# Patient Record
(demographics unavailable — no encounter records)

---

## 2024-11-08 NOTE — DATA REVIEWED
[de-identified] : Left: Hearing -4kHz with a moderate HL 6-8kHz Right: Mild to severe SNHL Type A tymps AU

## 2024-11-08 NOTE — HISTORY OF PRESENT ILLNESS
[de-identified] : 66 yo F history of right ear surgery now with right otalgia likely ETD - audio showed asymmetric SNHL - trial of steroids and Flonase. Continues to take Flonase. No change in hearing or otalgia. Continues to have intermittent tinnitus AD. No otorrhea, ear infections or vertigo or headaches related to hearing loss. Had MRI - normal.

## 2024-11-08 NOTE — DATA REVIEWED
[de-identified] : Left: Hearing -4kHz with a moderate HL 6-8kHz Right: Mild to severe SNHL Type A tymps AU

## 2024-11-20 NOTE — REASON FOR VISIT
[Follow-Up] : a follow-up visit [TextBox_44] : acid reflux, asthma, BOOP, dysphonia, GERD, esophageal spasm, snoring, and shortness of breath, OW, abnormal CT ?early ILDz

## 2024-11-20 NOTE — ADDENDUM
[FreeTextEntry1] : Documented by Ghada Vasquez acting as a scribe for Dr. Yvon Bryant on 11/20/2024. All medical record entries made by the Scribe were at my, Dr. vYon Bryant's, direction and personally dictated by me on 11/20/2024. I have reviewed the chart and agree that the record accurately reflects my personal performance of the history, physical exam, assessment and plan. I have also personally directed, reviewed, and agree with the discharge instructions.

## 2024-11-20 NOTE — PROCEDURE
[FreeTextEntry1] : Full PFT reveals normal flows; FEV1 was 2.33L which is 114% of predicted; normal lung volumes; normal diffusion at 13.45, which is 74% of predicted; normal flow volume loop. PFTs were performed to evaluate for asthma  6 minute walk test reveals a low saturation of 91% with no evidence of dyspnea or fatigue; walked 586.8 meters   FENO was 28; a normal value being less than 25 Fractional exhaled nitric oxide (FENO) is regarded as a simple, noninvasive method for assessing eosinophilic airway inflammation. Produced by a variety of cells within the lung, nitric oxide (NO) concentrations are generally low in healthy individuals. However, high concentrations of NO appear to be involved in nonspecific host defense mechanisms and chronic inflammatory diseases such as asthma. The American Thoracic Society (ATS) therefore has recommended using FENO to aid in the diagnosis and monitoring of eosinophilic airway inflammation and asthma, and for identifying steroid responsive individuals whose chronic respiratory symptoms may be caused by airway inflammation.

## 2024-11-20 NOTE — HISTORY OF PRESENT ILLNESS
[FreeTextEntry1] : Ms. Talley is a 67 year old female with a history of acid reflux, OW asthma, BOOP, dysphonia, GERD, esophageal spasm, snoring, and shortness of breath who comes in for a follow-up pulmonary evaluation.  Her chief complaint is   -she notes energy levels are good  -she notes exercising (walking 3 miles per day) -she notes appetite is stable  -she notes bowels are regular  -she notes reflux is controlled on Rx -she notes sleeping for 6-7 hours, interrupted when her daughter comes home late -she denies muscle or joint aches and pains -she notes mild sinus congestion at this time -she notes frequent exposures at school/work -she notes she's on Mullein since 2 weeks ago to reduce her mucus production -she notes she's coughing up a lot of clear, milky mucus throughout the day -she notes hearing loss. She's getting fitted for a hearing aid early 12/2024 -she notes drinking honey, manohar seeds, lemon, and hot water every morning  -she denies any headaches, nausea, emesis, fever, chills, sweats, chest pain, chest pressure, wheezing, palpitations, diarrhea, constipation, dysphagia, vertigo, arthralgias, myalgias, leg swelling, itchy eyes, itchy ears, or sour taste in the mouth.

## 2024-11-20 NOTE — ASSESSMENT
[FreeTextEntry1] : Ms. Talley is a 67 year old female who has a history of HLD, Anxiety, allergies, asthma, BOOP, OW, TMJ, reflux Rx (EgD (+) c/w Candida esophagitis and GERD. (reflux Rx (EgD (+) c/w Candida esophagitis) 2/2021; 9/2021 - Abnormal CT RUL nodule- symptomatic reflux (Still). S/p COVID-19 11/2022- active asthma, bronchitis 1/2023 - s/p Strep 11/223 - (resolved) - Thriving except asymptomatic abnormal CT "gg opacities"  Her shortness of breath is felt to be multifactorial due to: -asthma -early ILDz -poor breathing mechanics -overweight -out of shape -? Cardiac disease  Problem 1: Asthma - (quiet) -continue Bevespi 2 puffs BID - continue Alvesco 80 2 puffs BID prn -continue Ventolin 2 puffs Q6H, pre-exercise/ nebulizer Albuterol.83 q6H -continue Singulair 10 mg QD at bedtime -Asthma is believed to be caused by inherited (genetic) and environmental factor, but its exact cause is unknown. Asthma may be triggered by allergens, lung infections, or irritants in the air. Asthma triggers are different for each person -Inhaler technique reviewed as well as oral hygiene techniques reviewed with patient. Avoidance of cold air, extremes of temperature, rescue inhaler should be used before exercise. Order of medication reviewed with patient. Recommended use of a cool mist humidifier in the bedroom.  problem 1A: Eosinophilic Asthma -s/p Strongyloides (WNL) 5/2024 - Nucala/ Fasenra/Dupixent if needed  -The safety and efficacy of Nucala was established in three double-blind, randomized, placebo-controlled trials in patients with severe asthma. Compared to a placebo, patients with severe asthma receiving Nucala had fewer exacerbation requiring hospitalization and/or emergency department visits, and a longer time to first exacerbation. In addition, patients with severe asthma receiving Nucala or Fasenra experienced greater reductions in their daily maintenance oral corticosteroid dose, while maintaining asthma control compared with patients receiving placebo. Treatment with Nucala did not result in a significant improvement in lung function, as measured by the volume of air exhaled by patients in one second. The most common side effects include: headache, injection site reactions, back pain, weakness, and fatigue; hypersensitivity reactions can occur within hours or days including swelling of the face, mouth, and tongue, fainting, dizziness, hives, breathing problems, and rash; herpes zoster infections have occurred. The drug is a monoclonal antibody that inhibits interleukin-5 which helps regular eosinophils, a type of white blood cell that contributes to asthma. The over-production of eosinophils can cause inflammation in the lungs, increasing the frequency of asthma attacks. Patients must also take other medications, including high dose inhaled corticosteroids and at least one additional asthma drug.  Problem 1B: COVID-19 infection 11 /2022 (Resolved) -Paxlovid 5 day course -recommended 10 day quarantine -recommended Bruna Coleman Cold and Flu  Problem 2: BOOP -currently inactive, on no therapy  Problem 2A: Abnormal CT DDX - RUL density 1.9cm (resolved), ?early ILDz 6/2024 -rheumatology work-up negative 6/2024 -next HRCT of the chest prone and supine 5/2025- if progressive, get a biopsy -add  mg BID (11/2024) -continue Mullein -s/p blood work: Quantiferon Gold, Coccidioides Antibody, cryptococcus antibody, histoplasma antibody (WNL) -CAT scans are the only radiological modality to identify abnormalities within the lungs with regards to nodules/masses/lymph nodes. Risks, benefits were reviewed in detail. The guidelines for abnormalities include follow up CT scans at various intervals which could range from 6 weeks to 1 year intervals. If there is a change for the worse then consideration for a biopsy will be considered if the patient is a candidate. Second opinion evaluation with a thoracic surgeon or an interventional radiologist could be offered.  Problem 3: GERD / duodenal ulcer-resolved -continue Dexilant 60 mg QAM, pre-breakfast -continue Gaviscon PRN -continue Pepcid 40 mg QHS -s/p GI evaluation- EDG and colonoscopy performed (all normal) -Rule of 2s: avoid eating too much, eating too late, eating too spicy, eating two hours before bed -Things to avoid including overeating, spicy foods, tight clothing, eating within three hours of bed, this list is not all inclusive. -For treatment of reflux, possible options discussed including diet control, H2 blockers, PPIs, as well as coating motility agents discussed as treatment options. Timing of meals and proximity of last meal to sleep were discussed. If symptoms persist, a formal gastrointestinal evaluation is needed.  problem 3A: Candida Esophagitis (2021)- resolved -s/p Fluconazole x 1 month x 2nd time -recommended Immuno Force A  Problem 4: obesity (now overweight) -recommended "10-Day Detox Diet" by Dr. Makr Mayo followed by 2 weeks of probiotics -recommended to read "Bright Spots and Landmines" -recommended 150 minutes exercise a week -Weight loss, exercise, and diet control were discussed and are highly encouraged. Treatment options were given such as, aqua therapy, and contacting a nutritionist. Recommended to use the elliptical, stationary bike, less use of treadmill.  problem 5: TMJ (controlled) -recommend dental device to be used at night and was referred to Dr. Giuseppe Coleman  Problem 6: Allergy (quiet) -Continue Children's Claritin/ Zyrtec 10 qHS -continue Dymista 1 sniff BID -Recommended Xlear nasal saline spray Environmental measures for allergies were encouraged including mattress and pillow cover, air purifier, and environmental controls.  Problem 7: snoring ?ZAKIYA -complete home sleep study (NC) -Sleep apnea is associated with adverse clinical consequences which can affect most organ systems. Cardiovascular disease risk includes arrhythmias, atrial fibrillation, hypertension, coronary artery disease, and stroke. Metabolic disorders include diabetes type 2, non-alcoholic fatty liver disease. Mood disorder especially depression; and cognitive decline especially in the elderly. Associations with chronic reflux/Nelson's esophagus some but not all inclusive. -Reasons include arousal consistent with hypopnea; respiratory events most prominent in REM sleep or supine position; therefore sleep staging and body position are important for accurate diagnosis and estimation of AHI.  problem 8: health maintenance -s/p COVID 19 vaccine (Pfizer x 3) -s/p influenza vaccine -2023 -recommended strep pneumonia vaccines: Prevnar-13 vaccine, followed by Pneumo vaccine 23 one year following -recommended early intervention for URIs -recommended regular osteoporosis evaluations -recommended early dermatological evaluations -recommended after the age of 50 to the age of 70, colonoscopy every 5 years -Recommend thyroid tests for pre tibial edema and evaluation with Dr. Parish EDGE in 6 months She is encouraged to call with any changes, concerns, or questions.

## 2024-12-03 NOTE — HISTORY OF PRESENT ILLNESS
[FreeTextEntry1] : 67 year old female patient with asymmetrical hearing loss, Hearing -4000Hz sloping to moderate hearing loss 6000-8000Hz in the left ear and mild SNHL at 250 Hz to hearing -1000Hz to mild to severe SNHL 1500-8000Hz in the right ear with excellent speech discrimination AU.  Patient has history of middle ear surgery in the right ear 11 years ago. She is followed by Dr. Simpson who provided medical clearance for hearing aids. 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  She reports non-bothersome tinnitus and difficulties understanding speech, mostly in her right ear. Her biggest goal is to hear others better on a daily basis.

## 2024-12-03 NOTE — ASSESSMENT
[FreeTextEntry1] :  Reviewed patient's hearing loss and discussed implications on speech and language understanding. Discussed how hearing aids work, levels of technology, styles of hearing aids, rechargeability, and realistic expectations re: hearing aids. Demo'd Oticon Intent 1 miniRITE-R hearing aids at [facility] 3 with 8mm DV dome. Patient noted difference in sound quality and did not find sound overwhelming. Patient opted for Real 3 miniRITE-R hearing aids. Will order in color chestnut with 2-85 receivers and 8mm DV domes, bilaterally. Reviewed insurance benefit, patient paid deposit today.

## 2024-12-03 NOTE — ASSESSMENT
[FreeTextEntry1] :  Reviewed patient's hearing loss and discussed implications on speech and language understanding. Discussed how hearing aids work, levels of technology, styles of hearing aids, rechargeability, and realistic expectations re: hearing aids. Demo'd Oticon Intent 1 miniRITE-R hearing aids at [facility] 3 with 8mm DV dome. Patient noted difference in sound quality and did not find sound overwhelming. Patient opted for Real 3 miniRITE-R hearing aids. Will order in color chestnut with 2-85 receivers and 8mm DV domes, bilaterally. Reviewed insurance benefit, patient paid deposit today.

## 2025-03-04 NOTE — HISTORY OF PRESENT ILLNESS
[FreeTextEntry1] :  67 year old female patient with asymmetrical hearing loss, Hearing -4000Hz sloping to moderate hearing loss 6000-8000Hz in the left ear and mild SNHL at 250 Hz to hearing -1000Hz to mild to severe SNHL 1500-8000Hz in the right ear with excellent speech discrimination AU. Patient has history of middle ear surgery in the right ear 11 years ago. She is followed by Dr. Simpson  Hearing Aid Dispensed: Oticon Intent 3 MR-R Ears: AU Right SN: [id number] Left SN: [id number]  SN: [id number] Warranty Date: 02/05/2028 Receivers: 85-2 Domes: 8mm DV Accessory: MD-IT [id number]  [FreeTextEntry8] : Seen today for HAC following HA dispensing. Pt reports consistent use, very pleased with hearing aids- noted improvements in multiple listening environments.

## 2025-03-04 NOTE — PROCEDURE
[de-identified] : Cleaned and checked hearing aids, listening check revealed aids in good working order. Aids currently set to acclim level 2- increased to 3 in office- pt reported good fit and sound quality. Will leave aids set this way. Has follow up with Dr. Simpson in May, recommended HAC following updated audio.

## 2025-05-27 NOTE — IMAGING
[de-identified] : Right ring finger  Moderately swollen with evolving ecchymosis centered at the PIP joint, TTP  + FDS/FDP  Painful finger ROM  Able to make a loose composite fist  +AIN/ PIN/ Ulnar n SILT throughout fingers wwp  3 views right hand: Minimally displaced avulsion fracture from the base of the volar middle phalanx of the ring finger, preserved PIP and DIP joints

## 2025-05-27 NOTE — HISTORY OF PRESENT ILLNESS
[de-identified] : 05/27/2025 FREEMAN 67 year F is here for Location: Right hand  Complaint: The patient presents to the office today for evaluation of right ring finger pain and swelling.  She notes a sudden onset of pain and swelling after a mechanical fall at home, landing on her extended right ring finger.  She noted moderate pain and swelling but did not immediately seek evaluation.  Today the patient continues to note moderate pain with motion but denies numbness and tingling.  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

## 2025-05-27 NOTE — IMAGING
[de-identified] : Right ring finger  Moderately swollen with evolving ecchymosis centered at the PIP joint, TTP  + FDS/FDP  Painful finger ROM  Able to make a loose composite fist  +AIN/ PIN/ Ulnar n SILT throughout fingers wwp  3 views right hand: Minimally displaced avulsion fracture from the base of the volar middle phalanx of the ring finger, preserved PIP and DIP joints

## 2025-05-27 NOTE — ASSESSMENT
[FreeTextEntry1] : 67-year-old RHD F with right ring finger PIP sprain and avulsion fracture - reviewed the nature of this injury and prognosis with the patient in layman's terms - discussed indications for both operative and nonoperative treatment - reviewed conservative treatment options including the role for early active range of motion, anti-inflammatory medications and therapy - reviewed risks, benefits and alternatives to these - activity modifications reviewed, finger range of motion exercises demonstrated - buddy loops applied to the ring and middle fingers - NSAIDs as needed for pain, Mobic Rx provided, patient cautioned on side effect profile and risks with concomitant NSAID use - f/u in 2 weeks for motion check, will consider OT at that time   My cumulative time spent on this patient's visit included: Preparation for the visit, review of the medical records, review of pertinent diagnostic studies, examination and counseling of the patient on the above diagnosis, treatment plan and prognosis, orders of diagnostic tests, medications and/or appropriate procedures and documentation in the medical records of today's visit.  30 minutes were spent on this encounter
[FreeTextEntry1] : 67-year-old RHD F with right ring finger PIP sprain and avulsion fracture - reviewed the nature of this injury and prognosis with the patient in layman's terms - discussed indications for both operative and nonoperative treatment - reviewed conservative treatment options including the role for early active range of motion, anti-inflammatory medications and therapy - reviewed risks, benefits and alternatives to these - activity modifications reviewed, finger range of motion exercises demonstrated - buddy loops applied to the ring and middle fingers - NSAIDs as needed for pain, Mobic Rx provided, patient cautioned on side effect profile and risks with concomitant NSAID use - f/u in 2 weeks for motion check, will consider OT at that time   My cumulative time spent on this patient's visit included: Preparation for the visit, review of the medical records, review of pertinent diagnostic studies, examination and counseling of the patient on the above diagnosis, treatment plan and prognosis, orders of diagnostic tests, medications and/or appropriate procedures and documentation in the medical records of today's visit.  30 minutes were spent on this encounter
marijuana

## 2025-05-27 NOTE — HISTORY OF PRESENT ILLNESS
[de-identified] : 05/27/2025 FREEMAN 67 year F is here for Location: Right hand  Complaint: The patient presents to the office today for evaluation of right ring finger pain and swelling.  She notes a sudden onset of pain and swelling after a mechanical fall at home, landing on her extended right ring finger.  She noted moderate pain and swelling but did not immediately seek evaluation.  Today the patient continues to note moderate pain with motion but denies numbness and tingling.  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

## 2025-06-11 NOTE — IMAGING
[de-identified] : Right ring finger  Mildly swollen centered at the PIP joint, TTP  + FDS/FDP  Painful finger ROM, improved from prior Able to make a full composite fist  +AIN/ PIN/ Ulnar n SILT throughout fingers wwp  3 views right hand: Minimally displaced avulsion fracture from the base of the volar middle phalanx of the ring finger, preserved PIP and DIP joints

## 2025-06-11 NOTE — ASSESSMENT
[FreeTextEntry1] : 67-year-old RHD F with right ring finger PIP sprain and avulsion fracture - reviewed the nature of this injury and prognosis with the patient in layman's terms - discussed indications for both operative and nonoperative treatment - reviewed conservative treatment options including the role for early active range of motion, anti-inflammatory medications and therapy - reviewed risks, benefits and alternatives to these - activity modifications reviewed, continue finger range of motion exercises  - continue buddy loops applied to the ring and middle fingers - NSAIDs as needed for pain - f/u in 4 weeks for motion check, will consider OT at that time   My cumulative time spent on this patient's visit included: Preparation for the visit, review of the medical records, review of pertinent diagnostic studies, examination and counseling of the patient on the above diagnosis, treatment plan and prognosis, orders of diagnostic tests, medications and/or appropriate procedures and documentation in the medical records of today's visit.  20 minutes were spent on this encounter

## 2025-06-11 NOTE — HISTORY OF PRESENT ILLNESS
[de-identified] : 6/10/25: The patient returns office today for repeat evaluation of her right ring finger PIP sprain.  She has been alicia looping and working on range of motion as previously instructed.  She notes improvement in her motion and swelling.  No new injuries.    05/27/2025 FREEMAN 67 year F is here for Location: Right hand  Complaint: The patient presents to the office today for evaluation of right ring finger pain and swelling.  She notes a sudden onset of pain and swelling after a mechanical fall at home, landing on her extended right ring finger.  She noted moderate pain and swelling but did not immediately seek evaluation.  Today the patient continues to note moderate pain with motion but denies numbness and tingling.  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

## 2025-06-11 NOTE — IMAGING
[de-identified] : Right ring finger  Mildly swollen centered at the PIP joint, TTP  + FDS/FDP  Painful finger ROM, improved from prior Able to make a full composite fist  +AIN/ PIN/ Ulnar n SILT throughout fingers wwp  3 views right hand: Minimally displaced avulsion fracture from the base of the volar middle phalanx of the ring finger, preserved PIP and DIP joints

## 2025-06-11 NOTE — HISTORY OF PRESENT ILLNESS
[de-identified] : 6/10/25: The patient returns office today for repeat evaluation of her right ring finger PIP sprain.  She has been alicia looping and working on range of motion as previously instructed.  She notes improvement in her motion and swelling.  No new injuries.    05/27/2025 FREEMAN 67 year F is here for Location: Right hand  Complaint: The patient presents to the office today for evaluation of right ring finger pain and swelling.  She notes a sudden onset of pain and swelling after a mechanical fall at home, landing on her extended right ring finger.  She noted moderate pain and swelling but did not immediately seek evaluation.  Today the patient continues to note moderate pain with motion but denies numbness and tingling.  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

## 2025-07-23 NOTE — HISTORY OF PRESENT ILLNESS
[No Pertinent Cardiac History] : no history of aortic stenosis, atrial fibrillation, coronary artery disease, recent myocardial infarction, or implantable device/pacemaker [Asthma] : asthma [No Adverse Anesthesia Reaction] : no adverse anesthesia reaction in self or family member [Chronic Kidney Disease] : no chronic kidney disease [Diabetes] : no diabetes [FreeTextEntry1] : Cataract Extraction [FreeTextEntry2] : 7/31/25 & 8/14/25 [FreeTextEntry3] : Dr. Soriano [FreeTextEntry4] : Ms. FREEMAN SALCIDO is a 68 year old female hx of Asthma, ILD, HLD LPRD presenting for preoperative evaluation No acute concerns